# Patient Record
Sex: MALE | Race: BLACK OR AFRICAN AMERICAN | NOT HISPANIC OR LATINO | Employment: UNEMPLOYED | URBAN - METROPOLITAN AREA
[De-identification: names, ages, dates, MRNs, and addresses within clinical notes are randomized per-mention and may not be internally consistent; named-entity substitution may affect disease eponyms.]

---

## 2023-12-01 ENCOUNTER — TELEPHONE (OUTPATIENT)
Dept: PULMONOLOGY | Facility: CLINIC | Age: 58
End: 2023-12-01
Payer: MEDICARE

## 2023-12-01 DIAGNOSIS — J44.9 CHRONIC OBSTRUCTIVE PULMONARY DISEASE, UNSPECIFIED COPD TYPE: Primary | ICD-10-CM

## 2023-12-01 NOTE — TELEPHONE ENCOUNTER
PATRICIAM Advising patient  to give a call back in retards breakfast..My name and office number was provided.

## 2023-12-01 NOTE — TELEPHONE ENCOUNTER
----- Message from Jennifer Barnett sent at 12/1/2023  9:06 AM CST -----  Regarding: sooner appt  Contact: @ [phone]  Nurse is calling to reschedule appointment on 1/26 to a sooner date  ...no available dates Pleaes call and adv @ [phone]

## 2023-12-01 NOTE — TELEPHONE ENCOUNTER
----- Message from Jennifer Barnett sent at 12/1/2023  9:06 AM CST -----  Regarding: sooner appt  Contact: @ [phone]  Nurse is calling to reschedule appointment on 1/26 to a sooner date  ...no available dates Pleaes call and adv @ [phone]

## 2023-12-01 NOTE — TELEPHONE ENCOUNTER
Spoke with patient, informed him that I have received his message. I advised pt that I can schedule his appointment with Dr Fitzpatrick on 12/5/23 for 9:30am with Pulmonary Function Test prior beginning at 8:30am. Pt verbalized that he understand and accepted the appointment.

## 2023-12-05 ENCOUNTER — HOSPITAL ENCOUNTER (OUTPATIENT)
Dept: PULMONOLOGY | Facility: CLINIC | Age: 58
Discharge: HOME | End: 2023-12-05
Payer: MEDICARE

## 2023-12-05 ENCOUNTER — OFFICE VISIT (OUTPATIENT)
Dept: PULMONOLOGY | Facility: CLINIC | Age: 58
End: 2023-12-05
Payer: MEDICARE

## 2023-12-05 VITALS — HEIGHT: 68 IN | WEIGHT: 266.13 LBS | BODY MASS INDEX: 40.33 KG/M2

## 2023-12-05 VITALS
SYSTOLIC BLOOD PRESSURE: 130 MMHG | HEIGHT: 68 IN | DIASTOLIC BLOOD PRESSURE: 78 MMHG | WEIGHT: 266.13 LBS | HEART RATE: 67 BPM | OXYGEN SATURATION: 97 % | BODY MASS INDEX: 40.33 KG/M2

## 2023-12-05 DIAGNOSIS — I50.20 SYSTOLIC HEART FAILURE, UNSPECIFIED HF CHRONICITY: ICD-10-CM

## 2023-12-05 DIAGNOSIS — J44.9 CHRONIC OBSTRUCTIVE PULMONARY DISEASE, UNSPECIFIED COPD TYPE: ICD-10-CM

## 2023-12-05 DIAGNOSIS — J44.9 CHRONIC OBSTRUCTIVE PULMONARY DISEASE, UNSPECIFIED COPD TYPE: Primary | ICD-10-CM

## 2023-12-05 DIAGNOSIS — G47.33 OBSTRUCTIVE SLEEP APNEA: ICD-10-CM

## 2023-12-05 DIAGNOSIS — E66.01 CLASS 3 OBESITY: ICD-10-CM

## 2023-12-05 PROBLEM — E66.813 CLASS 3 OBESITY: Status: ACTIVE | Noted: 2023-12-05

## 2023-12-05 PROCEDURE — 94060 EVALUATION OF WHEEZING: CPT | Mod: 59,S$GLB,, | Performed by: INTERNAL MEDICINE

## 2023-12-05 PROCEDURE — 94618 PULMONARY STRESS TESTING: ICD-10-PCS | Mod: S$GLB,,, | Performed by: INTERNAL MEDICINE

## 2023-12-05 PROCEDURE — 94618 PULMONARY STRESS TESTING: CPT | Mod: S$GLB,,, | Performed by: INTERNAL MEDICINE

## 2023-12-05 PROCEDURE — 94729 PR C02/MEMBANE DIFFUSE CAPACITY: ICD-10-PCS | Mod: S$GLB,,, | Performed by: INTERNAL MEDICINE

## 2023-12-05 PROCEDURE — 3075F PR MOST RECENT SYSTOLIC BLOOD PRESS GE 130-139MM HG: ICD-10-PCS | Mod: CPTII,S$GLB,, | Performed by: INTERNAL MEDICINE

## 2023-12-05 PROCEDURE — 3078F DIAST BP <80 MM HG: CPT | Mod: CPTII,S$GLB,, | Performed by: INTERNAL MEDICINE

## 2023-12-05 PROCEDURE — 94727 PR PULM FUNCTION TEST BY GAS: ICD-10-PCS | Mod: S$GLB,,, | Performed by: INTERNAL MEDICINE

## 2023-12-05 PROCEDURE — 3075F SYST BP GE 130 - 139MM HG: CPT | Mod: CPTII,S$GLB,, | Performed by: INTERNAL MEDICINE

## 2023-12-05 PROCEDURE — 94729 DIFFUSING CAPACITY: CPT | Mod: S$GLB,,, | Performed by: INTERNAL MEDICINE

## 2023-12-05 PROCEDURE — 3008F PR BODY MASS INDEX (BMI) DOCUMENTED: ICD-10-PCS | Mod: CPTII,S$GLB,, | Performed by: INTERNAL MEDICINE

## 2023-12-05 PROCEDURE — 1159F PR MEDICATION LIST DOCUMENTED IN MEDICAL RECORD: ICD-10-PCS | Mod: CPTII,S$GLB,, | Performed by: INTERNAL MEDICINE

## 2023-12-05 PROCEDURE — 99205 PR OFFICE/OUTPT VISIT, NEW, LEVL V, 60-74 MIN: ICD-10-PCS | Mod: 25,S$GLB,, | Performed by: INTERNAL MEDICINE

## 2023-12-05 PROCEDURE — 4010F PR ACE/ARB THEARPY RXD/TAKEN: ICD-10-PCS | Mod: CPTII,S$GLB,, | Performed by: INTERNAL MEDICINE

## 2023-12-05 PROCEDURE — 1159F MED LIST DOCD IN RCRD: CPT | Mod: CPTII,S$GLB,, | Performed by: INTERNAL MEDICINE

## 2023-12-05 PROCEDURE — 94727 GAS DIL/WSHOT DETER LNG VOL: CPT | Mod: S$GLB,,, | Performed by: INTERNAL MEDICINE

## 2023-12-05 PROCEDURE — 99999 PR PBB SHADOW E&M-EST. PATIENT-LVL III: CPT | Mod: PBBFAC,,, | Performed by: INTERNAL MEDICINE

## 2023-12-05 PROCEDURE — 99999 PR PBB SHADOW E&M-EST. PATIENT-LVL III: ICD-10-PCS | Mod: PBBFAC,,, | Performed by: INTERNAL MEDICINE

## 2023-12-05 PROCEDURE — 94060 PR EVAL OF BRONCHOSPASM: ICD-10-PCS | Mod: 59,S$GLB,, | Performed by: INTERNAL MEDICINE

## 2023-12-05 PROCEDURE — 4010F ACE/ARB THERAPY RXD/TAKEN: CPT | Mod: CPTII,S$GLB,, | Performed by: INTERNAL MEDICINE

## 2023-12-05 PROCEDURE — 99205 OFFICE O/P NEW HI 60 MIN: CPT | Mod: 25,S$GLB,, | Performed by: INTERNAL MEDICINE

## 2023-12-05 PROCEDURE — 3078F PR MOST RECENT DIASTOLIC BLOOD PRESSURE < 80 MM HG: ICD-10-PCS | Mod: CPTII,S$GLB,, | Performed by: INTERNAL MEDICINE

## 2023-12-05 PROCEDURE — 3008F BODY MASS INDEX DOCD: CPT | Mod: CPTII,S$GLB,, | Performed by: INTERNAL MEDICINE

## 2023-12-05 RX ORDER — CLOTRIMAZOLE AND BETAMETHASONE DIPROPIONATE 10; .64 MG/G; MG/G
CREAM TOPICAL
COMMUNITY
Start: 2023-07-26

## 2023-12-05 RX ORDER — FLUTICASONE FUROATE, UMECLIDINIUM BROMIDE AND VILANTEROL TRIFENATATE 100; 62.5; 25 UG/1; UG/1; UG/1
POWDER RESPIRATORY (INHALATION)
COMMUNITY
Start: 2023-11-17

## 2023-12-05 RX ORDER — TIZANIDINE 4 MG/1
TABLET ORAL
COMMUNITY
Start: 2023-11-05

## 2023-12-05 RX ORDER — ALPRAZOLAM 0.25 MG/1
0.25 TABLET ORAL
COMMUNITY
Start: 2023-11-30

## 2023-12-05 RX ORDER — LOSARTAN POTASSIUM 50 MG/1
TABLET ORAL
COMMUNITY
Start: 2023-10-22

## 2023-12-05 RX ORDER — FLUTICASONE PROPIONATE AND SALMETEROL 250; 50 UG/1; UG/1
1 POWDER RESPIRATORY (INHALATION) 2 TIMES DAILY
COMMUNITY
Start: 2023-09-05 | End: 2024-01-22

## 2023-12-05 RX ORDER — METHOCARBAMOL 750 MG/1
750 TABLET, FILM COATED ORAL 3 TIMES DAILY
COMMUNITY
Start: 2023-06-09

## 2023-12-05 RX ORDER — FUROSEMIDE 40 MG/1
TABLET ORAL
COMMUNITY
Start: 2023-11-30

## 2023-12-05 RX ORDER — GLYCOPYRROLATE AND FORMOTEROL FUMARATE 9; 4.8 UG/1; UG/1
2 AEROSOL, METERED RESPIRATORY (INHALATION) 2 TIMES DAILY
COMMUNITY
Start: 2023-07-20 | End: 2024-01-22

## 2023-12-05 RX ORDER — METFORMIN HYDROCHLORIDE 500 MG/1
1000 TABLET, EXTENDED RELEASE ORAL 2 TIMES DAILY
COMMUNITY
Start: 2023-11-10

## 2023-12-05 RX ORDER — GABAPENTIN 600 MG/1
TABLET ORAL
COMMUNITY
Start: 2023-11-05

## 2023-12-05 RX ORDER — ROSUVASTATIN CALCIUM 5 MG/1
TABLET, COATED ORAL
COMMUNITY
Start: 2023-10-11

## 2023-12-05 RX ORDER — LANCETS
EACH MISCELLANEOUS 2 TIMES DAILY
COMMUNITY
Start: 2023-12-04

## 2023-12-05 RX ORDER — CARVEDILOL 25 MG/1
TABLET ORAL
COMMUNITY
Start: 2023-10-22

## 2023-12-05 RX ORDER — LORATADINE 10 MG/1
10 TABLET ORAL DAILY PRN
COMMUNITY
Start: 2023-11-10

## 2023-12-05 RX ORDER — AMLODIPINE BESYLATE 5 MG/1
TABLET ORAL
COMMUNITY
Start: 2023-10-11

## 2023-12-05 RX ORDER — LEVALBUTEROL INHALATION SOLUTION 0.63 MG/3ML
0.63 SOLUTION RESPIRATORY (INHALATION) 4 TIMES DAILY
COMMUNITY
Start: 2023-11-17

## 2023-12-05 RX ORDER — GLIPIZIDE 5 MG/1
TABLET ORAL
COMMUNITY
Start: 2023-10-11

## 2023-12-05 RX ORDER — ASPIRIN 81 MG/1
81 TABLET ORAL
COMMUNITY
Start: 2023-08-04

## 2023-12-05 RX ORDER — TIOTROPIUM BROMIDE INHALATION SPRAY 1.56 UG/1
SPRAY, METERED RESPIRATORY (INHALATION)
COMMUNITY
Start: 2023-07-27

## 2023-12-05 RX ORDER — SPIRONOLACTONE 25 MG/1
TABLET ORAL
COMMUNITY
Start: 2023-10-15
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
## 2023-12-05 NOTE — ASSESSMENT & PLAN NOTE
Moderate obstruction/restriction on PFTs w/ a severe reduction in DLCO. 5 pack years of tob but this was combined with heavy MJ and crack cocaine use. 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     Primary symptom is dyspnea. Minimal cough or exacerbations.     On Trelegy and prn nebulized LONNIE.    - Cont current inhaler regimen  - I need to review his CT imaging. There is a question of NTM contributing to his symptoms. We will work to obtain his discs and see him in a month  - Agree w/ obtaining a sputum sample for afb  - Check a 6 MWT for possible oxygen needs given his DLCO of 31  - Diet/exercise  - Congratulated on cont tob abstinence
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
## 2023-12-05 NOTE — PROCEDURES
Jesus Lucio is a 58 y.o.  male patient, who presents for a 6 minute walk test ordered by MD Nanette.  The diagnosis is COPD; Cardiomyopathy.  The patient's BMI is 40.5 kg/m2.  Predicted distance (lower limit of normal) is 364.22 meters.      Test Results:    The test was completed without stopping. The total time walked was 360 seconds. During walking, the patient reported:  Dyspnea. The patient used no assistive devices during testing.     12/05/2023---------Distance: 389.23 meters (1277 feet)     O2 Sat % Supplemental Oxygen Heart Rate Blood Pressure Madhav Scale   Pre-exercise  (Resting) 94 % Room Air 77 bpm 108/74 mmHg 3   During Exercise 89 % Room Air 94 bpm 132/80 mmHg 4   Post-exercise  (Recovery) 94 % Room Air  68 bpm 125/80 mmHg      Recovery Time: 128 seconds    Performing nurse/tech: Regina DIAZ      PREVIOUS STUDY:   The patient has not had a previous study.      CLINICAL INTERPRETATION:  Six minute walk distance is 389.23 meters (1277 feet) with somewhat heavy dyspnea.  During exercise, there was significant desaturation while breathing room air.  Both blood pressure and heart rate remained stable with walking.  The patient did not report non-pulmonary symptoms during exercise.  No previous study performed.  Based upon age and body mass index, exercise capacity is normal.

## 2024-01-22 ENCOUNTER — OFFICE VISIT (OUTPATIENT)
Dept: PULMONOLOGY | Facility: CLINIC | Age: 59
End: 2024-01-22
Payer: MEDICARE

## 2024-01-22 VITALS
OXYGEN SATURATION: 95 % | HEART RATE: 85 BPM | WEIGHT: 255.5 LBS | SYSTOLIC BLOOD PRESSURE: 130 MMHG | BODY MASS INDEX: 38.72 KG/M2 | DIASTOLIC BLOOD PRESSURE: 75 MMHG | HEIGHT: 68 IN

## 2024-01-22 DIAGNOSIS — E66.01 CLASS 3 OBESITY: ICD-10-CM

## 2024-01-22 DIAGNOSIS — J44.9 CHRONIC OBSTRUCTIVE PULMONARY DISEASE, UNSPECIFIED COPD TYPE: Primary | ICD-10-CM

## 2024-01-22 DIAGNOSIS — G47.33 OBSTRUCTIVE SLEEP APNEA: ICD-10-CM

## 2024-01-22 PROCEDURE — 3078F DIAST BP <80 MM HG: CPT | Mod: CPTII,S$GLB,, | Performed by: INTERNAL MEDICINE

## 2024-01-22 PROCEDURE — 99999 PR PBB SHADOW E&M-EST. PATIENT-LVL II: CPT | Mod: PBBFAC,,, | Performed by: INTERNAL MEDICINE

## 2024-01-22 PROCEDURE — 99213 OFFICE O/P EST LOW 20 MIN: CPT | Mod: S$GLB,,, | Performed by: INTERNAL MEDICINE

## 2024-01-22 PROCEDURE — 3075F SYST BP GE 130 - 139MM HG: CPT | Mod: CPTII,S$GLB,, | Performed by: INTERNAL MEDICINE

## 2024-01-22 PROCEDURE — 3008F BODY MASS INDEX DOCD: CPT | Mod: CPTII,S$GLB,, | Performed by: INTERNAL MEDICINE

## 2024-01-22 RX ORDER — DOXYCYCLINE 100 MG/1
100 CAPSULE ORAL 2 TIMES DAILY
Qty: 14 CAPSULE | Refills: 0 | Status: SHIPPED | OUTPATIENT
Start: 2024-01-22

## 2024-01-22 RX ORDER — RIFAMPIN 300 MG/1
600 CAPSULE ORAL
COMMUNITY
Start: 2024-01-08

## 2024-01-22 RX ORDER — PREDNISONE 20 MG/1
20 TABLET ORAL DAILY
Qty: 10 TABLET | Refills: 1 | Status: SHIPPED | OUTPATIENT
Start: 2024-01-22

## 2024-01-22 RX ORDER — OXYCODONE AND ACETAMINOPHEN 10; 325 MG/1; MG/1
1 TABLET ORAL
COMMUNITY
Start: 2023-12-27

## 2024-01-22 RX ORDER — ONDANSETRON 4 MG/1
4 TABLET, FILM COATED ORAL EVERY 8 HOURS PRN
COMMUNITY
Start: 2024-01-09

## 2024-01-22 RX ORDER — LIDOCAINE HYDROCHLORIDE 20 MG/ML
5 SOLUTION ORAL; TOPICAL 3 TIMES DAILY
COMMUNITY
Start: 2024-01-04

## 2024-01-22 NOTE — ASSESSMENT & PLAN NOTE
Moderate obstruction/restriction on PFTs w/ a severe reduction in DLCO. 5 pack years of tob but this was combined with heavy MJ and crack cocaine use. 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      Primary symptom is dyspnea. Does report a URI the last week. Symptoms starting to improve      On Trelegy and prn nebulized LONNIE.     - Cont trelegy and prn LONNIE  - CT report available from OSH. Diffuse emphysema, small L pleural effusion and TIB opacities in the lingula.    - Following w/ OSH ID with plans for abx?  - Agree w/ obtaining a sputum sample for afb  - Diet/exercise  - Congratulated on cont tob abstinence    Prescribed a course of doxy and pred in case current URI worsens
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
## 2025-05-16 ENCOUNTER — HOSPITAL ENCOUNTER (OUTPATIENT)
Dept: PULMONOLOGY | Facility: CLINIC | Age: 60
Discharge: HOME | End: 2025-05-16
Payer: MEDICARE

## 2025-05-16 ENCOUNTER — OFFICE VISIT (OUTPATIENT)
Dept: PULMONOLOGY | Facility: CLINIC | Age: 60
End: 2025-05-16
Payer: MEDICARE

## 2025-05-16 ENCOUNTER — LAB VISIT (OUTPATIENT)
Dept: LAB | Facility: HOSPITAL | Age: 60
End: 2025-05-16
Attending: INTERNAL MEDICINE
Payer: MEDICARE

## 2025-05-16 VITALS
HEIGHT: 68 IN | SYSTOLIC BLOOD PRESSURE: 132 MMHG | WEIGHT: 256.38 LBS | OXYGEN SATURATION: 97 % | BODY MASS INDEX: 38.86 KG/M2 | DIASTOLIC BLOOD PRESSURE: 78 MMHG | HEART RATE: 92 BPM

## 2025-05-16 VITALS — HEIGHT: 68 IN | WEIGHT: 256 LBS | BODY MASS INDEX: 38.8 KG/M2

## 2025-05-16 DIAGNOSIS — G47.33 OBSTRUCTIVE SLEEP APNEA: ICD-10-CM

## 2025-05-16 DIAGNOSIS — J96.11 CHRONIC HYPOXIC RESPIRATORY FAILURE: ICD-10-CM

## 2025-05-16 DIAGNOSIS — J44.9 CHRONIC OBSTRUCTIVE PULMONARY DISEASE, UNSPECIFIED COPD TYPE: ICD-10-CM

## 2025-05-16 DIAGNOSIS — J44.9 CHRONIC OBSTRUCTIVE PULMONARY DISEASE, UNSPECIFIED COPD TYPE: Primary | ICD-10-CM

## 2025-05-16 DIAGNOSIS — E66.01 CLASS 2 SEVERE OBESITY WITH SERIOUS COMORBIDITY AND BODY MASS INDEX (BMI) OF 38.0 TO 38.9 IN ADULT, UNSPECIFIED OBESITY TYPE: ICD-10-CM

## 2025-05-16 DIAGNOSIS — E66.812 CLASS 2 SEVERE OBESITY WITH SERIOUS COMORBIDITY AND BODY MASS INDEX (BMI) OF 38.0 TO 38.9 IN ADULT, UNSPECIFIED OBESITY TYPE: ICD-10-CM

## 2025-05-16 LAB
A1AT SERPL-MCNC: 145 MG/DL (ref 100–190)
DLCO SINGLE BREATH LLN: 20.94
DLCO SINGLE BREATH PRE REF: 28.9 %
DLCO SINGLE BREATH REF: 27.86
DLCOC SBVA LLN: 2.89
DLCOC SBVA REF: 4.15
DLCOC SINGLE BREATH LLN: 20.94
DLCOC SINGLE BREATH REF: 27.86
DLCOCSBVAULN: 5.4
DLCOCSINGLEBREATHULN: 34.79
DLCOSINGLEBREATHULN: 34.79
DLCOSINGLEBREATHZSCORE: -4.7
DLCOVA LLN: 2.89
DLCOVA PRE REF: 49.9 %
DLCOVA PRE: 2.07 ML/(MIN*MMHG*L) (ref 2.89–5.4)
DLCOVA REF: 4.15
DLCOVAULN: 5.4
ERV LLN: -16448.83
ERV PRE REF: 66 %
ERV REF: 1.17
ERVULN: ABNORMAL
FEF 25 75 LLN: 1.85
FEF 25 75 PRE REF: 28.2 %
FEF 25 75 REF: 3.56
FET100 CHG: 43.2 %
FEV05 LLN: 1.52
FEV05 REF: 2.66
FEV1 CHG: 3 %
FEV1 FVC LLN: 67
FEV1 FVC PRE REF: 82.9 %
FEV1 FVC REF: 79
FEV1 LLN: 2.12
FEV1 PRE REF: 57.7 %
FEV1 REF: 2.91
FEV1 VOL CHG: 0.05
FEV1FVCZSCORE: -1.9
FEV1ZSCORE: -2.51
FRCPLETH LLN: 2.49
FRCPLETH PREREF: 87.5 %
FRCPLETH REF: 3.47
FRCPLETHULN: 4.46
FVC CHG: 5.9 %
FVC LLN: 2.76
FVC PRE REF: 69.6 %
FVC REF: 3.69
FVC VOL CHG: 0.15
FVCZSCORE: -1.97
IVC PRE: 2.68 L (ref 2.76–4.65)
IVC SINGLE BREATH LLN: 2.76
IVC SINGLE BREATH PRE REF: 72.5 %
IVC SINGLE BREATH REF: 3.69
IVCSINGLEBREATHULN: 4.65
LLN IC: -9999996.9
PEF LLN: 5.51
PEF PRE REF: 32.7 %
PEF REF: 8.01
POST FEF 25 75: 0.83 L/S (ref 1.85–5.27)
POST FET 100: 9.1 SEC
POST FEV1 FVC: 63.44 % (ref 67.15–88.7)
POST FEV1: 1.73 L (ref 2.12–3.65)
POST FEV5: 1.32 L (ref 1.52–3.79)
POST FVC: 2.72 L (ref 2.76–4.65)
POST PEF: 4.1 L/S (ref 5.51–10.52)
PRE DLCO: 8.06 ML/(MIN*MMHG) (ref 20.94–34.79)
PRE ERV: 0.77 L (ref -16448.83–16451.17)
PRE FEF 25 75: 1 L/S (ref 1.85–5.27)
PRE FET 100: 6.35 SEC
PRE FEV05 REF: 45.8 %
PRE FEV1 FVC: 65.22 % (ref 67.15–88.7)
PRE FEV1: 1.68 L (ref 2.12–3.65)
PRE FEV5: 1.22 L (ref 1.52–3.79)
PRE FRC PL: 3.04 L (ref 2.49–4.46)
PRE FVC: 2.57 L (ref 2.76–4.65)
PRE IC: 1.97 L (ref -9999996.9–#######.####)
PRE PEF: 2.62 L/S (ref 5.51–10.52)
PRE REF IC: 63.5 %
PRE RV: 2.27 L (ref 1.63–2.98)
PRE TLC: 5.01 L (ref 5.57–7.87)
RAW PRE REF: 138.7 %
RAW PRE: 4.24 CMH2O*S/L (ref 3.06–3.06)
RAW REF: 3.06
REF IC: 3.1
RV LLN: 1.63
RV PRE REF: 98.4 %
RV REF: 2.31
RVTLC LLN: 28
RVTLC PRE REF: 124 %
RVTLC PRE: 45.37 % (ref 27.6–45.56)
RVTLC REF: 37
RVTLCULN: 46
RVULN: 2.98
SGAW PRE REF: 80.6 %
SGAW PRE: 0.07 1/(CMH2O*S) (ref 0.08–0.08)
SGAW REF: 0.08
TLC LLN: 5.57
TLC PRE REF: 74.5 %
TLC REF: 6.72
TLC ULN: 7.87
TLCZSCORE: -2.45
ULN IC: ABNORMAL
VA PRE: 3.89 L (ref 6.57–6.57)
VA SINGLE BREATH LLN: 6.57
VA SINGLE BREATH PRE REF: 59.2 %
VA SINGLE BREATH REF: 6.57
VASINGLEBREATHULN: 6.57
VC LLN: 2.76
VC PRE REF: 74 %
VC PRE: 2.74 L (ref 2.76–4.65)
VC REF: 3.69
VC ULN: 4.65

## 2025-05-16 PROCEDURE — 94726 PLETHYSMOGRAPHY LUNG VOLUMES: CPT | Mod: S$GLB,,, | Performed by: INTERNAL MEDICINE

## 2025-05-16 PROCEDURE — 36415 COLL VENOUS BLD VENIPUNCTURE: CPT

## 2025-05-16 PROCEDURE — 94618 PULMONARY STRESS TESTING: CPT | Mod: S$GLB,,, | Performed by: INTERNAL MEDICINE

## 2025-05-16 PROCEDURE — 99999 PR PBB SHADOW E&M-EST. PATIENT-LVL IV: CPT | Mod: PBBFAC,,, | Performed by: INTERNAL MEDICINE

## 2025-05-16 PROCEDURE — 94060 EVALUATION OF WHEEZING: CPT | Mod: 59,S$GLB,, | Performed by: INTERNAL MEDICINE

## 2025-05-16 PROCEDURE — 94729 DIFFUSING CAPACITY: CPT | Mod: S$GLB,,, | Performed by: INTERNAL MEDICINE

## 2025-05-16 PROCEDURE — 82103 ALPHA-1-ANTITRYPSIN TOTAL: CPT

## 2025-05-16 RX ORDER — METOPROLOL SUCCINATE 50 MG/1
50 TABLET, EXTENDED RELEASE ORAL NIGHTLY
COMMUNITY
Start: 2025-04-13

## 2025-05-16 RX ORDER — ALBUTEROL SULFATE 0.83 MG/ML
2.5 SOLUTION RESPIRATORY (INHALATION) EVERY 6 HOURS PRN
Qty: 90 ML | Refills: 11 | Status: SHIPPED | OUTPATIENT
Start: 2025-05-16 | End: 2026-05-16

## 2025-05-16 NOTE — PROCEDURES
Jesus Lucio is a 59 y.o.   male patient, who presents for a 6 minute walk test ordered by MD Nanette.  The diagnosis is COPD.  The patient's BMI is 38.9 kg/m2. Predicted distance (lower limit of normal) is 366.25 meters.    Test Results:    The test was not completed.  The patient stopped 1 time. The total time walked was 244 seconds.  During walking, the patient reported:  Dyspnea, Other (Comment) (stomach very tight). The patient used no assistive devices during testing.     05/16/2025---------Distance: 304.8 meters (1000 feet)     O2 Sat % Supplemental Oxygen Heart Rate Blood Pressure Madhav Scale   Pre-exercise  (Resting) 99 % Room Air 74 bpm 106/75  mmHg 0   During Exercise 88 % Room Air 68 bpm 122/85  mmHg 5-6   Post-exercise   93 % Room Air  69 bpm    mmHg       Recovery Time: 157 seconds    Oxygen Qualification:     O2 Sat % Supplemental Oxygen Heart Rate Blood Pressure Madhav Scale   Pre-exercise  (Resting) 99 % 2 L/M  70 bpm  120/80    mmHg 1    During Exercise 98 %  2 L/M  102 bpm  120/80   mmHg 3    Post-exercise   98 %  2 L/M  90 bpm      mmHg         Recovery Time: 40 seconds    Performing nurse/tech: Michael BUNDY    PREVIOUS STUDY:   The patient had a previous study.  12/05/2023---------Distance: 389.23 meters (1277 feet)       O2 Sat % Supplemental Oxygen Heart Rate Blood Pressure Madhav Scale   Pre-exercise  (Resting) 94 % Room Air 77 bpm 108/74 mmHg 3   During Exercise 89 % Room Air 94 bpm 132/80 mmHg 4   Post-exercise  (Recovery) 94 % Room Air  68 bpm 125/80 mmHg          CLINICAL INTERPRETATION:  Six minute walk distance is 304.8 meters (1000 feet) with somewhat heavy dyspnea.  During exercise, there was significant desaturation while breathing room air.  Both blood pressure and heart rate remained stable with walking.  The patient reported non-pulmonary symptoms during exercise.  The patient did complete the study, walking 244 seconds of the 360 second test.  The patient may benefit from using  supplemental oxygen during exertion.  Since the previous study in 12/2023, exercise capacity is significantly worse.  Based upon age and body mass index, exercise capacity is less than predicted.   Oxygen saturation did improve while breathing supplemental oxygen.
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
## 2025-05-18 LAB
DLCO SINGLE BREATH LLN: 20.94
DLCO SINGLE BREATH PRE REF: 28.9 %
DLCO SINGLE BREATH REF: 27.86
DLCOC SBVA LLN: 2.89
DLCOC SBVA REF: 4.15
DLCOC SINGLE BREATH LLN: 20.94
DLCOC SINGLE BREATH REF: 27.86
DLCOCSBVAULN: 5.4
DLCOCSINGLEBREATHULN: 34.79
DLCOSINGLEBREATHULN: 34.79
DLCOSINGLEBREATHZSCORE: -4.7
DLCOVA LLN: 2.89
DLCOVA PRE REF: 49.9 %
DLCOVA PRE: 2.07 ML/(MIN*MMHG*L) (ref 2.89–5.4)
DLCOVA REF: 4.15
DLCOVAULN: 5.4
ERV LLN: -16448.83
ERV PRE REF: 66 %
ERV REF: 1.17
ERVULN: ABNORMAL
FEF 25 75 LLN: 1.85
FEF 25 75 PRE REF: 28.2 %
FEF 25 75 REF: 3.56
FET100 CHG: 43.2 %
FEV05 LLN: 1.52
FEV05 REF: 2.66
FEV1 CHG: 3 %
FEV1 FVC LLN: 67
FEV1 FVC PRE REF: 82.9 %
FEV1 FVC REF: 79
FEV1 LLN: 2.12
FEV1 PRE REF: 57.7 %
FEV1 REF: 2.91
FEV1 VOL CHG: 0.05
FEV1FVCZSCORE: -1.9
FEV1ZSCORE: -2.51
FRCPLETH LLN: 2.49
FRCPLETH PREREF: 87.5 %
FRCPLETH REF: 3.47
FRCPLETHULN: 4.46
FVC CHG: 5.9 %
FVC LLN: 2.76
FVC PRE REF: 69.6 %
FVC REF: 3.69
FVC VOL CHG: 0.15
FVCZSCORE: -1.97
IVC PRE: 2.68 L (ref 2.76–4.65)
IVC SINGLE BREATH LLN: 2.76
IVC SINGLE BREATH PRE REF: 72.5 %
IVC SINGLE BREATH REF: 3.69
IVCSINGLEBREATHULN: 4.65
LLN IC: -9999996.9
PEF LLN: 5.51
PEF PRE REF: 32.7 %
PEF REF: 8.01
PHYSICIAN COMMENT: ABNORMAL
POST FEF 25 75: 0.83 L/S (ref 1.85–5.27)
POST FET 100: 9.1 SEC
POST FEV1 FVC: 63.44 % (ref 67.15–88.7)
POST FEV1: 1.73 L (ref 2.12–3.65)
POST FEV5: 1.32 L (ref 1.52–3.79)
POST FVC: 2.72 L (ref 2.76–4.65)
POST PEF: 4.1 L/S (ref 5.51–10.52)
PRE DLCO: 8.06 ML/(MIN*MMHG) (ref 20.94–34.79)
PRE ERV: 0.77 L (ref -16448.83–16451.17)
PRE FEF 25 75: 1 L/S (ref 1.85–5.27)
PRE FET 100: 6.35 SEC
PRE FEV05 REF: 45.8 %
PRE FEV1 FVC: 65.22 % (ref 67.15–88.7)
PRE FEV1: 1.68 L (ref 2.12–3.65)
PRE FEV5: 1.22 L (ref 1.52–3.79)
PRE FRC PL: 3.04 L (ref 2.49–4.46)
PRE FVC: 2.57 L (ref 2.76–4.65)
PRE IC: 1.97 L (ref -9999996.9–#######.####)
PRE PEF: 2.62 L/S (ref 5.51–10.52)
PRE REF IC: 63.5 %
PRE RV: 2.27 L (ref 1.63–2.98)
PRE TLC: 5.01 L (ref 5.57–7.87)
RAW PRE REF: 138.7 %
RAW PRE: 4.24 CMH2O*S/L (ref 3.06–3.06)
RAW REF: 3.06
REF IC: 3.1
RV LLN: 1.63
RV PRE REF: 98.4 %
RV REF: 2.31
RVTLC LLN: 28
RVTLC PRE REF: 124 %
RVTLC PRE: 45.37 % (ref 27.6–45.56)
RVTLC REF: 37
RVTLCULN: 46
RVULN: 2.98
SGAW PRE REF: 80.6 %
SGAW PRE: 0.07 1/(CMH2O*S) (ref 0.08–0.08)
SGAW REF: 0.08
TLC LLN: 5.57
TLC PRE REF: 74.5 %
TLC REF: 6.72
TLC ULN: 7.87
TLCZSCORE: -2.45
ULN IC: ABNORMAL
VA PRE: 3.89 L (ref 6.57–6.57)
VA SINGLE BREATH LLN: 6.57
VA SINGLE BREATH PRE REF: 59.2 %
VA SINGLE BREATH REF: 6.57
VASINGLEBREATHULN: 6.57
VC LLN: 2.76
VC PRE REF: 74 %
VC PRE: 2.74 L (ref 2.76–4.65)
VC REF: 3.69
VC ULN: 4.65

## 2025-05-30 PROBLEM — E66.812 CLASS 2 SEVERE OBESITY WITH SERIOUS COMORBIDITY AND BODY MASS INDEX (BMI) OF 38.0 TO 38.9 IN ADULT, UNSPECIFIED OBESITY TYPE: Status: ACTIVE | Noted: 2025-05-30

## 2025-05-30 PROBLEM — E66.01 CLASS 2 SEVERE OBESITY WITH SERIOUS COMORBIDITY AND BODY MASS INDEX (BMI) OF 38.0 TO 38.9 IN ADULT, UNSPECIFIED OBESITY TYPE: Status: ACTIVE | Noted: 2025-05-30

## 2025-06-23 ENCOUNTER — TELEPHONE (OUTPATIENT)
Dept: PULMONOLOGY | Facility: CLINIC | Age: 60
End: 2025-06-23
Payer: MEDICARE

## 2025-06-23 NOTE — TELEPHONE ENCOUNTER
Copied from CRM #[id number]. Topic: Appointments - Amb Referral  >> Jun 23, 2025  2:07 PM Lance wrote:  Consult/Advisory    Name Of Caller: Markus       Contact Preference:[phone]    Nature of call: [facility] called regarding a referral a pt stated  the pt was waiting on [facility] rehab appt to call they are asking if a referral was sent to see someone and was he supposed to be seeing someone for rehab

## 2025-06-24 ENCOUNTER — TELEPHONE (OUTPATIENT)
Dept: PULMONOLOGY | Facility: CLINIC | Age: 60
End: 2025-06-24
Payer: MEDICARE

## 2025-06-24 NOTE — TELEPHONE ENCOUNTER
I spoke with Ms. Aparicio with [facility] in regards to patient referral for Pulmonary Rehab. Patient is asking for a referral. I informed her that I will send a message to Dr. Fitzpatrick. She verbalized understanding.

## 2025-07-02 ENCOUNTER — TELEPHONE (OUTPATIENT)
Dept: PULMONOLOGY | Facility: HOSPITAL | Age: 60
End: 2025-07-02
Payer: MEDICARE

## 2025-07-02 NOTE — TELEPHONE ENCOUNTER
Received a call from Audrey at [facility] regarding a referral to [facility] rehab on this pt. She was asking the status of starting this pt in our program. Spoke to Giovanna our  who stated the pt did not want to drive to [place] twice a week to attend rehab. He would possibly be interested in attending rehab in [place]. iGovanna stated she communicated this with Paula Ray MA who had reached out to us originally. Audrey with [facility] contacted also and given this info.

## 2025-07-03 ENCOUNTER — TELEPHONE (OUTPATIENT)
Dept: PULMONOLOGY | Facility: CLINIC | Age: 60
End: 2025-07-03
Payer: MEDICARE

## 2025-07-03 NOTE — TELEPHONE ENCOUNTER
Call was returned to Audrey with [facility] in regards to the patient Pulmonary Rehab. I informed her that this office does not schedule Pulmonary Rehab. She verbalized understanding.    I spoke with patient that states he's on 2 liter but sometimes he still have SOB. I advised patient if he's in any respiratory distress to go to the nearest ED. Patient verbalized understanding.

## 2025-07-03 NOTE — TELEPHONE ENCOUNTER
Copied from CRM #[id number]. Topic: Appointments - Amb Referral  >> Jul 3, 2025  8:32 AM Lance wrote:  Consult/Advisory    Name Of Caller:Audrey       Contact Preference:[phone]    Nature of call: [facility] called wanting to get the pt seen today stating it's urgent she said he needs Pulm rehab due to respiratory issues.He is asking to be seen in [place] I wasn't sure if the dr does pulm rehab please call the nurse at [facility]  >> Jul 3, 2025 11:21 AM Med Assistant Aguilar wrote:  Good morning, I got a call from a provider at [facility]. Stated that this pt is supposed to be in pulm rehab, can you please have Dr. Fitzpatrick place the order? I see there was message back on 6/24/25, pt is still waiting. He can be scheduled here in [place] when the order is in. Please reach out to Xavi Erickson in teams when it is in so apt can be made.Thanks, Lauren  ----- Message -----  From: Lance Orellana  Sent: 7/3/2025   8:39 AM CDT  To: [facility]    >> Jul 3, 2025 11:20 AM Med Assistant Aguilar wrote:  >> Jul 3, 2025  8:47 AM Med Assistant Aguilar wrote:  Spoke to Aurdey

## 2025-07-29 PROBLEM — J96.11 CHRONIC RESPIRATORY FAILURE WITH HYPOXIA: Status: ACTIVE | Noted: 2025-07-29

## 2025-08-13 ENCOUNTER — TELEPHONE (OUTPATIENT)
Dept: PULMONOLOGY | Facility: CLINIC | Age: 60
End: 2025-08-13
Payer: MEDICARE

## 2025-08-22 ENCOUNTER — OFFICE VISIT (OUTPATIENT)
Dept: PULMONOLOGY | Facility: CLINIC | Age: 60
End: 2025-08-22
Payer: MEDICARE

## 2025-08-22 ENCOUNTER — HOSPITAL ENCOUNTER (OUTPATIENT)
Dept: CARDIOLOGY | Facility: HOSPITAL | Age: 60
Discharge: HOME | End: 2025-08-22
Attending: INTERNAL MEDICINE
Payer: MEDICARE

## 2025-08-22 ENCOUNTER — PATIENT MESSAGE (OUTPATIENT)
Dept: CARDIOLOGY | Facility: CLINIC | Age: 60
End: 2025-08-22
Payer: MEDICARE

## 2025-08-22 VITALS
WEIGHT: 254.44 LBS | HEART RATE: 81 BPM | SYSTOLIC BLOOD PRESSURE: 96 MMHG | DIASTOLIC BLOOD PRESSURE: 74 MMHG | BODY MASS INDEX: 38.56 KG/M2 | HEIGHT: 68 IN

## 2025-08-22 VITALS
HEART RATE: 88 BPM | HEIGHT: 68 IN | WEIGHT: 254.44 LBS | BODY MASS INDEX: 38.56 KG/M2 | SYSTOLIC BLOOD PRESSURE: 140 MMHG | OXYGEN SATURATION: 90 % | DIASTOLIC BLOOD PRESSURE: 82 MMHG

## 2025-08-22 DIAGNOSIS — N17.9 ACUTE KIDNEY INJURY SUPERIMPOSED ON CKD: ICD-10-CM

## 2025-08-22 DIAGNOSIS — I50.20 SYSTOLIC HEART FAILURE, UNSPECIFIED HF CHRONICITY: ICD-10-CM

## 2025-08-22 DIAGNOSIS — G47.33 OBSTRUCTIVE SLEEP APNEA: ICD-10-CM

## 2025-08-22 DIAGNOSIS — N18.9 ACUTE KIDNEY INJURY SUPERIMPOSED ON CKD: ICD-10-CM

## 2025-08-22 DIAGNOSIS — N18.9 CHRONIC KIDNEY DISEASE, UNSPECIFIED CKD STAGE: ICD-10-CM

## 2025-08-22 DIAGNOSIS — I27.20 PULMONARY HYPERTENSION: Primary | ICD-10-CM

## 2025-08-22 DIAGNOSIS — I50.43 ACUTE ON CHRONIC COMBINED SYSTOLIC AND DIASTOLIC CONGESTIVE HEART FAILURE: ICD-10-CM

## 2025-08-22 DIAGNOSIS — J44.9 CHRONIC OBSTRUCTIVE PULMONARY DISEASE, UNSPECIFIED COPD TYPE: ICD-10-CM

## 2025-08-22 DIAGNOSIS — I50.43 ACUTE ON CHRONIC COMBINED SYSTOLIC AND DIASTOLIC CONGESTIVE HEART FAILURE: Primary | ICD-10-CM

## 2025-08-22 DIAGNOSIS — I27.20 PULMONARY HYPERTENSION: ICD-10-CM

## 2025-08-22 LAB
AORTIC SIZE INDEX (SOV): 1.2 CM/M2
AORTIC SIZE INDEX: 1.2 CM/M2
ASCENDING AORTA: 2.7 CM
AV AREA BY CONTINUOUS VTI: 1.8 CM2
AV INDEX (PROSTH): 0.43
AV LVOT MEAN GRADIENT: 0 MMHG
AV LVOT PEAK GRADIENT: 0 MMHG
AV MEAN GRADIENT: 1 MMHG
AV PEAK GRADIENT: 2 MMHG
AV VALVE AREA BY VELOCITY RATIO: 2.4 CM²
AV VALVE AREA: 1.8 CM2
AV VELOCITY RATIO: 0.57
BSA FOR ECHO PROCEDURE: 2.35 M2
CV ECHO LV RWT: 0.23 CM
DOP CALC AO PEAK VEL: 0.7 M/S
DOP CALC AO VTI: 10.6 CM
DOP CALC LVOT AREA: 4.2 CM2
DOP CALC LVOT DIAMETER: 2.3 CM
DOP CALC LVOT PEAK VEL: 0.4 M/S
DOP CALC RVOT AREA: 4.37 CM2
DOP CALC RVOT DIAMETER: 2.36 CM
DOP CALCLVOT PEAK VEL VTI: 4.6 CM
E WAVE DECELERATION TIME: 101 MS
E/A RATIO: 1.86
E/E' RATIO: 13 M/S
ECHO EF ESTIMATED: 9 %
ECHO LV POSTERIOR WALL: 0.9 CM (ref 0.6–1.1)
FRACTIONAL SHORTENING: 5 % (ref 28–44)
HR MV ECHO: 81 BPM
INTERVENTRICULAR SEPTUM: 0.5 CM (ref 0.6–1.1)
IVC DIAMETER: 2.82 CM
IVRT: 120 MS
LA MAJOR: 7.1 CM
LA MINOR: 7.1 CM
LA WIDTH: 4.5 CM
LEFT ATRIUM SIZE: 5.3 CM
LEFT ATRIUM VOLUME INDEX MOD: 41 ML/M2
LEFT ATRIUM VOLUME INDEX: 64 ML/M2
LEFT ATRIUM VOLUME MOD: 92 ML
LEFT ATRIUM VOLUME: 144 CM3
LEFT INTERNAL DIMENSION IN SYSTOLE: 7.6 CM (ref 2.1–4)
LEFT VENTRICLE DIASTOLIC VOLUME INDEX: 150.44 ML/M2
LEFT VENTRICLE DIASTOLIC VOLUME: 340 ML
LEFT VENTRICLE MASS INDEX: 117.5 G/M2
LEFT VENTRICLE SYSTOLIC VOLUME INDEX: 137.6 ML/M2
LEFT VENTRICLE SYSTOLIC VOLUME: 311 ML
LEFT VENTRICULAR INTERNAL DIMENSION IN DIASTOLE: 8 CM (ref 3.5–6)
LEFT VENTRICULAR MASS: 265.7 G
LV LATERAL E/E' RATIO: 13
LV SEPTAL E/E' RATIO: 13
Lab: 1.6 CM/M
Lab: 1.6 CM/M
MR PISA EROA: 0.36 CM2
MV A" WAVE DURATION": 74.22 MS
MV MEAN GRADIENT: 1 MMHG
MV PEAK A VEL: 0.42 M/S
MV PEAK E VEL: 0.78 M/S
MV PEAK GRADIENT: 3 MMHG
OHS CV CPX PATIENT HEIGHT IN: 68
OHS CV RV/LV RATIO: 0.54 CM
PISA MRMAX VEL: 4.27 M/S
PISA RADIUS: 0.86 CM
PISA TR MAX VEL: 3.5 M/S
PISA VN NYQUIST: 0.33 M/S
PULM VEIN A" WAVE DURATION": 74.22 MS
PULM VEIN S/D RATIO: 0.77
PULMONIC VEIN PEAK A VELOCITY: 0.2 M/S
PV PEAK D VEL: 0.44 M/S
PV PEAK S VEL: 0.34 M/S
RA MAJOR: 6.67 CM
RA PRESSURE ESTIMATED: 15 MMHG
RA WIDTH: 4.98 CM
RIGHT ATRIAL AREA: 26.6 CM2
RIGHT ATRIUM END SYSTOLIC VOLUME APICAL 4 CHAMBER INDEX BSA: 38.05 ML/M2
RIGHT ATRIUM VOLUME AREA LENGTH APICAL 4 CHAMBER: 86 ML
RIGHT VENTRICLE DIASTOLIC BASEL DIMENSION: 4.3 CM
RV TB RVSP: 19 MMHG
RV TISSUE DOPPLER FREE WALL SYSTOLIC VELOCITY 1 (APICAL 4 CHAMBER VIEW): 15.64 CM/S
SINUS: 2.8 CM
STJ: 2.7 CM
TDI LATERAL: 0.06 M/S
TDI SEPTAL: 0.06 M/S
TDI: 0.06 M/S
TRICUSPID ANNULAR PLANE SYSTOLIC EXCURSION: 2.2 CM
TV PEAK GRADIENT: 48 MMHG
TV REST PULMONARY ARTERY PRESSURE: 64 MMHG
Z-SCORE OF LEFT VENTRICULAR DIMENSION IN END DIASTOLE: -0.36
Z-SCORE OF LEFT VENTRICULAR DIMENSION IN END SYSTOLE: 3.28

## 2025-08-22 PROCEDURE — 93306 TTE W/DOPPLER COMPLETE: CPT | Mod: 26,,, | Performed by: INTERNAL MEDICINE

## 2025-08-22 PROCEDURE — 99999 PR PBB SHADOW E&M-EST. PATIENT-LVL V: CPT | Mod: PBBFAC,,, | Performed by: INTERNAL MEDICINE

## 2025-08-22 PROCEDURE — C8929 TTE W OR WO FOL WCON,DOPPLER: HCPCS

## 2025-08-22 RX ORDER — DOCUSATE SODIUM 100 MG/1
100 CAPSULE, LIQUID FILLED ORAL 2 TIMES DAILY
COMMUNITY

## 2025-08-24 ENCOUNTER — TELEPHONE (OUTPATIENT)
Dept: CARDIOLOGY | Facility: CLINIC | Age: 60
End: 2025-08-24
Payer: MEDICARE

## 2025-08-25 ENCOUNTER — TELEPHONE (OUTPATIENT)
Dept: PULMONOLOGY | Facility: CLINIC | Age: 60
End: 2025-08-25
Payer: MEDICARE

## 2025-08-25 PROBLEM — I50.20 SYSTOLIC HEART FAILURE: Status: RESOLVED | Noted: 2023-12-05 | Resolved: 2025-08-25

## 2025-08-26 ENCOUNTER — OFFICE VISIT (OUTPATIENT)
Dept: CARDIOLOGY | Facility: CLINIC | Age: 60
End: 2025-08-26
Payer: MEDICARE

## 2025-08-26 ENCOUNTER — DOCUMENTATION ONLY (OUTPATIENT)
Dept: CARDIOLOGY | Facility: HOSPITAL | Age: 60
End: 2025-08-26
Payer: MEDICARE

## 2025-08-26 VITALS
HEART RATE: 90 BPM | OXYGEN SATURATION: 95 % | DIASTOLIC BLOOD PRESSURE: 71 MMHG | BODY MASS INDEX: 36.26 KG/M2 | SYSTOLIC BLOOD PRESSURE: 106 MMHG | WEIGHT: 253.31 LBS | HEIGHT: 70 IN

## 2025-08-26 DIAGNOSIS — I50.43 ACUTE ON CHRONIC COMBINED SYSTOLIC AND DIASTOLIC CONGESTIVE HEART FAILURE: Primary | ICD-10-CM

## 2025-08-26 DIAGNOSIS — N17.9 AKI (ACUTE KIDNEY INJURY): ICD-10-CM

## 2025-08-26 DIAGNOSIS — E66.01 SEVERE OBESITY: ICD-10-CM

## 2025-08-26 PROBLEM — R06.02 SOB (SHORTNESS OF BREATH): Status: ACTIVE | Noted: 2025-08-26

## 2025-08-26 PROBLEM — E11.9 TYPE 2 DIABETES MELLITUS WITHOUT COMPLICATION, WITHOUT LONG-TERM CURRENT USE OF INSULIN: Status: ACTIVE | Noted: 2025-08-26

## 2025-08-26 PROCEDURE — 1159F MED LIST DOCD IN RCRD: CPT | Mod: CPTII,S$GLB,, | Performed by: INTERNAL MEDICINE

## 2025-08-26 PROCEDURE — 99205 OFFICE O/P NEW HI 60 MIN: CPT | Mod: S$GLB,,, | Performed by: INTERNAL MEDICINE

## 2025-08-26 PROCEDURE — 3078F DIAST BP <80 MM HG: CPT | Mod: CPTII,S$GLB,, | Performed by: INTERNAL MEDICINE

## 2025-08-26 PROCEDURE — 99999 PR PBB SHADOW E&M-EST. PATIENT-LVL IV: CPT | Mod: PBBFAC,,, | Performed by: INTERNAL MEDICINE

## 2025-08-26 PROCEDURE — 3074F SYST BP LT 130 MM HG: CPT | Mod: CPTII,S$GLB,, | Performed by: INTERNAL MEDICINE

## 2025-08-26 PROCEDURE — 3008F BODY MASS INDEX DOCD: CPT | Mod: CPTII,S$GLB,, | Performed by: INTERNAL MEDICINE

## 2025-08-27 PROBLEM — Z22.7 TB LUNG, LATENT: Status: ACTIVE | Noted: 2025-08-27

## 2025-08-28 ENCOUNTER — TELEPHONE (OUTPATIENT)
Dept: TRANSPLANT | Facility: CLINIC | Age: 60
End: 2025-08-28
Payer: MEDICARE

## 2025-08-29 ENCOUNTER — DOCUMENTATION ONLY (OUTPATIENT)
Dept: TRANSPLANT | Facility: CLINIC | Age: 60
End: 2025-08-29
Payer: MEDICARE

## 2025-09-02 PROBLEM — G47.33 OSA (OBSTRUCTIVE SLEEP APNEA): Chronic | Status: ACTIVE | Noted: 2023-12-05

## 2025-09-05 ENCOUNTER — DOCUMENTATION ONLY (OUTPATIENT)
Dept: CARDIOLOGY | Facility: HOSPITAL | Age: 60
End: 2025-09-05
Payer: MEDICARE

## 2025-09-05 ENCOUNTER — TELEPHONE (OUTPATIENT)
Dept: TRANSPLANT | Facility: CLINIC | Age: 60
End: 2025-09-05
Payer: MEDICARE

## 2025-09-05 PROBLEM — R00.0 SINUS TACHYCARDIA: Status: ACTIVE | Noted: 2025-09-05

## 2025-09-05 PROBLEM — R91.1 LUNG NODULE SEEN ON IMAGING STUDY: Status: ACTIVE | Noted: 2025-09-05

## 2025-09-06 PROBLEM — D69.6 THROMBOCYTOPENIA, UNSPECIFIED: Status: ACTIVE | Noted: 2025-09-06

## 2025-09-06 PROBLEM — G44.89 OTHER HEADACHE SYNDROME: Status: ACTIVE | Noted: 2025-09-06
